# Patient Record
Sex: FEMALE | Race: WHITE | NOT HISPANIC OR LATINO | Employment: UNEMPLOYED | URBAN - METROPOLITAN AREA
[De-identification: names, ages, dates, MRNs, and addresses within clinical notes are randomized per-mention and may not be internally consistent; named-entity substitution may affect disease eponyms.]

---

## 2024-09-12 ENCOUNTER — CLINICAL SUPPORT (OUTPATIENT)
Dept: AUDIOLOGY | Facility: CLINIC | Age: 69
End: 2024-09-12
Payer: MEDICARE

## 2024-09-12 DIAGNOSIS — H90.3 SENSORINEURAL HEARING LOSS, BILATERAL: Primary | ICD-10-CM

## 2024-09-12 PROCEDURE — 92603 COCHLEAR IMPLT F/UP EXAM 7/>: CPT

## 2024-09-12 NOTE — PROGRESS NOTES
Annual ADULT COCHLEAR IMPLANT FOLLOW UP PROGRAMMING    Clinical Indication: sensorineural hearing loss      HISTORY:  Kay Phoenix, age 67 years, was seen for optimization of her right [employer] processor and left Phonak Link hearing aid. She reported the left earmold sometimes slides out, that she only hears static on the right and that the processor is too loud. I re counseled her that she previously had a helix lock on her earmold that she disliked so much she had us remove it and better retention would mean re adding that lock. She did not want a new earmold when she left today.       She utilizes an Advanced Bionics HiRes Ulta 3D (Slim J) SN: [id number] cochlear implant of the right ear to be used in conjunction with a Leticia CI M90 sound processor SN: [id number] and Leticia Link M hearing aid on the left ear with a custom ear mold (SN: [id number]). She underwent surgery with Dr. Arellano on July 1, 2022. Kay has history of of right chronic otitis media and cholesteatoma on the previously closed ear canal.   Previous case history:  Kay also reports a constant static noise in her implant with speech stimuli. She reports her left earmold continues to cause her discomfort; however she did not follow up at [facility] as recommended previously. No modifications could be made in office today due to office not being equipped with tools. This was previously explained to the patient and she verbalized understanding then and today.     Position Program Description   1 Vidible Default     Data Logging:  Left hearing aid: 16.1 hours  Right Leticia Cochlear Implant: 3.1 hours       This patient was seen for her annual follow-up visit with her cochlear implant.  The surgical site appears well-healed and healthy.  The magnet strength was appropriate.    Impedances have increased over time, likely due to lack of use.  Programming adjustments were made based on new M measurement.  The patient liked the sound quality  better and denied any loudness discomfort.     Aided audiometric testing was deferred    Time spent with patient: 45 minutes    Recommendations:  Full time use of right cochlear implant andfélix Leticia Damon. Counseled on wear time of right CI  Return annually  for programming and optimization of cochlear implant processor  Return if she decides she wants a new earmold  Follow up with ENT surgeon annually; sooner if concerns arise  Continue AVT therapy as recommended  Contact cochlear  if there are concerns with processor or accessories  Annual hearing assessment as warranted     Completed by:  Charissa Najera, Nena, CCC-A, [facility]  Licensed Audiologist   Certified Occupational Hearing Conservationist

## 2025-09-15 ENCOUNTER — APPOINTMENT (OUTPATIENT)
Dept: AUDIOLOGY | Facility: CLINIC | Age: 70
End: 2025-09-15
Payer: MEDICARE